# Patient Record
Sex: FEMALE | Race: WHITE | NOT HISPANIC OR LATINO | Employment: OTHER | URBAN - METROPOLITAN AREA
[De-identification: names, ages, dates, MRNs, and addresses within clinical notes are randomized per-mention and may not be internally consistent; named-entity substitution may affect disease eponyms.]

---

## 2017-01-02 ENCOUNTER — GENERIC CONVERSION - ENCOUNTER (OUTPATIENT)
Dept: OTHER | Facility: OTHER | Age: 82
End: 2017-01-02

## 2017-01-04 ENCOUNTER — GENERIC CONVERSION - ENCOUNTER (OUTPATIENT)
Dept: OTHER | Facility: OTHER | Age: 82
End: 2017-01-04

## 2017-01-10 ENCOUNTER — GENERIC CONVERSION - ENCOUNTER (OUTPATIENT)
Dept: OTHER | Facility: OTHER | Age: 82
End: 2017-01-10

## 2017-02-02 ENCOUNTER — GENERIC CONVERSION - ENCOUNTER (OUTPATIENT)
Dept: OTHER | Facility: OTHER | Age: 82
End: 2017-02-02

## 2018-01-10 NOTE — RESULT NOTES
Verified Results  (1) IRON [id number] 10:06AM Kalpesh Wilhelm     Test Name Result Flag Reference   IRON, TOTAL 68 mcg/dL       (Q) CBC (H/H, RBC, INDICES, WBC, PLT) [id number] 10:06AM Kalpesh Wilhelm     Test Name Result Flag Reference   WHITE BLOOD CELL COUNT 10 6 Thousand/uL  3 8-10 8   RED BLOOD CELL COUNT 4 10 Million/uL  3 80-5 10   HEMOGLOBIN 10 9 g/dL L 11 7-15 5   HEMATOCRIT 32 9 % L 35 0-45 0   MCV 80 2 fL  80 0-100 0   MCH 26 6 pg L 27 0-33 0   MCHC 33 2 g/dL  32 0-36 0   RDW 17 7 % H 11 0-15 0   PLATELET COUNT 529 Thousand/uL H 140-400   MPV 7 9 fL  7 5-11 5     (1) OP BINTA FERRITIN [id number] 10:06AM Kalpesh Wilhelm     Test Name Result Flag Reference   FERRITIN 29 ng/mL

## 2018-01-10 NOTE — RESULT NOTES
Message   Recorded as Task   Date: 02/28/2016 01:44 PM, Created By: Kalpesh Wilhelm   Task Name: Call Patient with results   Assigned To: Kalpesh Wilhelm   Regarding Patient: Inez Orellana, Status:  In Progress   Comment:    Kalpesh Wilhelm - 28 Feb 2016 1:44 PM     Patient Phone: [phone] enlarged from 3,0 to 3 5cm  refer to Dr Vanessa Severin for evaluation   Danica Urban - 02 Mar 2016 1:31 PM     TASK IN PROGRESS   Danica Urban - 02 Mar 2016 1:38 PM     TASK EDITED  pt notified of need to follow up with vascular - she will discuss further with TS when she comes in tomorrow with her  for his appt with TS     Signatures   Electronically signed by : Farhat Tello, ; Mar  2 2016  1:40PM EST                       (Author)

## 2018-01-12 NOTE — RESULT NOTES
Verified Results  US ABDOMINAL AORTA [id number] 12:43PM Juani Murphy Order Number: [id number]     Test Name Result Flag Reference   US ABDOMINAL AORTA (Report)     ABDOMINAL AORTIC ULTRASOUND     INDICATION: Evaluate for aortic aneurysm  COMPARISON: 4/13/2011  FINDINGS:      Ultrasound of the abdominal aorta was performed in longitudinal and transverse planes with a curvilinear transducer  Proximal aorta: 2 6 cm   Mid aorta:  1 8 cm   Distal aorta:  3 5 cm   Right common iliac origin: 1 3 cm   Left common iliac origin: 1 5 cm     No periaortic collections or adenopathy detected  IMPRESSION:     3 5 cm infrarenal abdominal aortic aneurysm  This has increased in size from CT of 4/13/2011 when it measured 3 0 cm  Workstation performed: [id number]     Signed by:    Johnathon Delatorre MD   2/25/16

## 2018-01-13 NOTE — RESULT NOTES
Message   Recorded as Task   Date: 01/04/2017 11:22 AM, Created By: Vivi Tenorio   Task Name: Follow Up   Assigned To: Z[facility]air,Pharmacy Team   Regarding Patient: Jenelle Morales, Status: Active   Yeyo Finch - 04 Jan 2017 11:22 AM     TASK CREATED  Caller: ESTELITA ARCE; General Medical Question; [phone]  Patient's  called and wanted Dr Jessica Bianchi to know that patient was seen by a Visiting Nurse and she told the patient that she has thursh  Per 's patient he said that Dr Jessica Bianchi prescribed a mouth wash for her but now is asking if something can be prescribed for the thrush and he said that we can call him an leave a message if he does not answer the phone so he knows     Kalpesh Wilhelm - 04 Jan 2017 12:00 PM     TASK REPLIED TO: Previously Assigned To Zuhair,Pharmacy Team  I sent a prescription to the patient's pharmacy for Mycelex lg she's to dissolve one her mouth 5 times a day for 2 weeks   Nav Day - 04 Jan 2017 12:20 PM     TASK EDITED  I lmom for pt and  as to what TS sent in     Signatures   Electronically signed by : Joel Mack, ; Jan 4 2017 12:21PM EST                       (Author)

## 2018-01-13 NOTE — RESULT NOTES
Verified Results  (1) LIPID PANEL, FASTING [id number] 08:07AM YarelychaloKalpesh shaw     Test Name Result Flag Reference   CHOLESTEROL, TOTAL 133 mg/dL  125-200   HDL CHOLESTEROL 39 mg/dL L > OR = 46   TRIGLICERIDES 480 mg/dL H <150   LDL-CHOLESTEROL 58 mg/dL (calc)  <130   Desirable range <100 mg/dL for patients with CHD or  diabetes and <70 mg/dL for diabetic patients with  known heart disease  CHOL/HDLC RATIO 3 4 (calc)  < OR = 5 0   NON HDL CHOLESTEROL 94 mg/dL (calc)     Target for non-HDL cholesterol is 30 mg/dL higher than   LDL cholesterol target  (Q) MICROALBUMIN, RANDOM URINE (W/CREATININE) [id number] 08:07AM Yarelyjeremiah Kalpesh     Test Name Result Flag Reference   CREATININE, RANDOM URINE 53 mg/dL     MICROALBUMIN 0 6 mg/dL     Reference Range  Not established   MICROALBUMIN/CREATININE$RATIO, RANDOM URINE 11 mcg/mg creat  <30   The ADA defines abnormalities in albumin  excretion as follows:     Category         Result (mcg/mg creatinine)     Normal                    <30  Microalbuminuria            Clinical albuminuria   > OR = 300     The ADA recommends that at least two of three  specimens collected within a 3-6 month period be  abnormal before considering a patient to be  within a diagnostic category  (1) COMPREHENSIVE METABOLIC PANEL [id number] 31:36PO Kalpesh Wilhelm     Test Name Result Flag Reference   GLUCOSE 101 mg/dL H 65-99   Fasting reference interval   UREA NITROGEN (BUN) 24 mg/dL  7-25   CREATININE 0 90 mg/dL H 0 60-0 88   For patients >52years of age, the reference limit  for Creatinine is approximately 13% higher for people  identified as -American  eGFR NON-AFR   AMERICAN 56 mL/min/1 73m2 L > OR = 60   eGFR AFRICAN AMERICAN 65 mL/min/1 73m2  > OR = 60   BUN/CREATININE RATIO 27 (calc) H 6-22   SODIUM 135 mmol/L  135-146   POTASSIUM 4 5 mmol/L  3 5-5 3   CHLORIDE 98 mmol/L     CARBON DIOXIDE 27 mmol/L  19-30   CALCIUM 10 3 mg/dL  8 6-10 4   PROTEIN, TOTAL 7 5 g/dL  6 1-8 1   ALBUMIN 4 5 g/dL  3 6-5 1   GLOBULIN 3 0 g/dL (calc)  1 9-3 7   ALBUMIN/GLOBULIN RATIO 1 5 (calc)  1 0-2 5   BILIRUBIN, TOTAL 0 8 mg/dL  0 2-1 2   ALKALINE PHOSPHATASE 79 U/L     AST 15 U/L  10-35   ALT 13 U/L  6-29     (1) CBC/PLT/DIFF [id number] 08:07AM Choco Kalpesh     Test Name Result Flag Reference   WHITE BLOOD CELL COUNT 8 1 Thousand/uL  3 8-10 8   RED BLOOD CELL COUNT 4 45 Million/uL  3 80-5 10   HEMOGLOBIN 12 2 g/dL  11 7-15 5   HEMATOCRIT 36 7 %  35 0-45 0   MCV 82 5 fL  80 0-100 0   MCH 27 5 pg  27 0-33 0   MCHC 33 3 g/dL  32 0-36 0   RDW 16 4 % H 11 0-15 0   PLATELET COUNT 016 Thousand/uL H 140-400   MPV 8 6 fL  7 5-11 5   ABSOLUTE NEUTROPHILS 6326 cells/uL  8729-2239   ABSOLUTE LYMPHOCYTES 1069 cells/uL  850-3900   ABSOLUTE MONOCYTES 413 cells/uL  200-950   ABSOLUTE EOSINOPHILS 243 cells/uL     ABSOLUTE BASOPHILS 49 cells/uL  0-200   NEUTROPHILS 78 1 %     LYMPHOCYTES 13 2 %     MONOCYTES 5 1 %     EOSINOPHILS 3 0 %     BASOPHILS 0 6 %       (1) T4, FREE [id number] 08:07AM Choco Kalpesh     Test Name Result Flag Reference   T4, FREE 1 1 ng/dL  0 8-1 8     (Q) TSH, 3RD GENERATION [id number] 08:07AM Kalpesh Wilhelm     Test Name Result Flag Reference   TSH 1 36 mIU/L  0 40-4 50     (Q) HEMOGLOBIN A1c [id number] 08:07AM Kalpesh Wilhelm     Test Name Result Flag Reference   HEMOGLOBIN A1c 5 8 % of total Hgb H <5 7   According to ADA guidelines, hemoglobin A1c <7 0%  represents optimal control in non-pregnant diabetic  patients  Different metrics may apply to specific  patient populations  Standards of Medical Care in    Diabetes Care   2013;36:s11-s66     For the purpose of screening for the presence of  diabetes  <5 7%       Consistent with the absence of diabetes  5 7-6 4%    Consistent with increased risk for diabetes              (prediabetes)  >or=6 5%    Consistent with diabetes     This assay result is consistent with an increased risk  of diabetes  Currently, no consensus exists for use of hemoglobin  A1c for diagnosis of diabetes for children       (Q) URINALYSIS REFLEX [id number] 08:07AM Kalpesh Wilhelm   REPORT COMMENT:  FASTING:YES     Test Name Result Flag Reference   COLOR YELLOW  YELLOW   APPEARANCE CLEAR  CLEAR   SPECIFIC GRAVITY 1 012  1 001-1 035   PH 6 0  5 0-8 0   GLUCOSE NEGATIVE  NEGATIVE   BILIRUBIN NEGATIVE  NEGATIVE   KETONES NEGATIVE  NEGATIVE   OCCULT BLOOD NEGATIVE  NEGATIVE   PROTEIN NEGATIVE  NEGATIVE   NITRITE POSITIVE A NEGATIVE   LEUKOCYTE ESTERASE 3+ A NEGATIVE   WBC > OR = 60 /HPF A < OR = 5   RBC NONE SEEN /HPF  < OR = 2   SQUAMOUS EPITHELIAL CELLS NONE SEEN /HPF  < OR = 5   BACTERIA MODERATE /HPF A NONE SEEN   HYALINE CAST NONE SEEN /LPF  NONE SEEN

## 2018-01-14 NOTE — MISCELLANEOUS
Message  Message Free Text Note Form: pt with sore mouth, no coating redness or blisters  will rx today with lido and pt to make appt tomorrow  if worsens to er      Plan    1   Lidocaine Viscous 2 % Mouth/Throat Solution; Swish and spit 1-2 teaspoons 4   times a day when necessary mouth and throat pain    Signatures   Electronically signed by : Marin Price DO; Jan 2 2017 11:28AM EST                       (Author)

## 2018-01-14 NOTE — RESULT NOTES
Verified Results  (1) AFB CULTURE (MYCOBACTERIA) [id number] 11:34AM Melissa Minor     Test Name Result Flag Reference   CLINICAL REPORT (Report)     Test:        AFB Culture with Stain  Specimen Source: Other  Specimen Type:    Other  Specimen Date:   11/1/2016 11:34 AM  Result Date:    12/21/2016 10:18 AM  Result Status:   Final result  Resulting Lab:   [address] [address]            Tel: [phone]      CULTURE                                       ------------------                                   No AFB Isolated at 6 Weeks      STAIN                                        ------------------                                   No acid fast bacilli seen   Tissue culture right thigh       Signatures   Electronically signed by : SARAH Zee ; Dec 22 2016  7:51AM EST                       (Author)

## 2018-01-15 NOTE — MISCELLANEOUS
Message   Date: 16 Nov 2016 11:21 AM EST, Recorded By: Glenn Malin For: [facility],Team   Pierre from [facility] called regarding pt stating she was seen in the hospital by Tae Solomon  Wound was debrided on 11/1/16  No wound care instructions were given  Offered to schedule appt for tomorrow at [facility]  Pierre would like pt to be seen today or thinks she needs to go to the ER  Pierre states the wound is necrotic measuring 19x 16 x 2 and is red around the edges  Pt is afebrile and no chills  Informed Pierre that the provider is in the OR today doing surgeries but if she feels the pt needs to go to the ER then I advised her to do so  Pierre will advise pt to go to ED for eval          Active Problems    1  Abdominal aortic aneurysm without rupture (441 4) (I71 4)   2  Allergic rhinitis (477 9) (J30 9)   3  Anemia of chronic disease (285 29) (D63 8)   4  Atrial fibrillation (427 31) (I48 91)   5  Cataract (366 9) (H26 9)   6  Cellulitis (682 9) (L03 90)   7  Chronic kidney disease, stage 3 (585 3) (N18 3)   8  Coronary artery disease (414 00) (I25 10)   9  Depression (311) (F32 9)   10  Encounter for screening mammogram for malignant neoplasm of breast (V76 12)    (Z12 31)   11  History of Hematuria (599 70) (R31 9)   12  Hyperlipidemia (272 4) (E78 5)   13  Hypertension (401 9) (I10)   14  Hypothyroidism (244 9) (E03 9)   15  Insect bite (919 4,E906 4) (W57 XXXA)   16  Leukocytosis (288 60) (D72 829)   17  Mitral Valve Replacement   18  Need for influenza vaccination (V04 81) (Z23)   19  Need for prophylactic vaccination and inoculation against influenza (V04 81) (Z23)   20  Osteoarthritis (715 90) (M19 90)   21  Osteoporosis (733 00) (M81 0)   22  Pulmonary heart disease, chronic (416 9) (I27 9)   23  Renal cyst, acquired (593 2) (N28 1)   24  Skin Neoplasm Of The Face (239 2)   25  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)   26   Urinary, incontinence, stress female (625  6) (N39 3)   27  Vitamin D deficiency (268 9) (E55 9)    Current Meds   1  Aspirin 81 MG Oral Tablet Chewable; CHEW AND SWALLOW 1 TABLET DAILY; Therapy: 13SCY1249 to (Evaluate:12Bvb5293); Last EE:77PNL8644 Ordered   2  Atorvastatin Calcium 40 MG Oral Tablet; TAKE 1 TABLET EVERY DAY; Therapy: 30WVE9426 to (Evaluate:38Vcx0291)  Requested for: 05Hro6171; Last   Rx:44Nyu6181 Ordered   3  Cefuroxime Axetil 500 MG Oral Tablet; TAKE 1 TABLET Twice daily until gone; Therapy: 56HRV8687 to (Chastity Carter)  Requested for: 88PYT1521; Last   Rx:39Ncb6339 Ordered   4  Chlorthalidone 25 MG Oral Tablet; Take 1 tablet daily; Therapy: 03Ylj2408-(Evaluate:34Sma1275) Recorded   5  Fluticasone Propionate 50 MCG/ACT Nasal Suspension (Flonase); use 2 sprays in each   nostril once daily; Therapy: 19Kkj8298 to (Last Rx:57Sai3628)  Requested for: 26Stf2111 Ordered   6  Levothyroxine Sodium 88 MCG Oral Tablet; Take one tablet daily; Therapy: 88LLK7618 to (Last Rx:21Myi3695)  Requested for: 93Met2034 Ordered   7  Metoprolol Tartrate 25 MG Oral Tablet; Take 1 tablet twice daily; Therapy: 02XEB9756 to (Evaluate:05Yxl2692)  Requested for: 69NNL1531; Last   Rx:46Xou1559 Ordered   8  Nitrostat 0 4 MG Sublingual Tablet Sublingual (Nitroglycerin); 1 TAB UNDER THE   TONGUE EVERY 5 MIN  UPTO 3 DOSES AS NEEDED FOR CHEST PAIN  CALL   911 IF PAIN PERSISTS; Last Rx:71Wgq7456 Ordered   9  PARoxetine HCl - 20 MG Oral Tablet (Paxil); TAKE ONE TABLET BY MOUTH ONCE   DAILY; Therapy: 75EDT0620 to (Evaluate:78Fdp6646)  Requested for: 49Wpq3808; Last   Rx:11Avf9815 Ordered   10  Vitamin D 2000 UNIT Oral Capsule; TAKE 1 CAPSULE Daily; Therapy: 91GUN1570 to (Last Rx:06Rcu2282) Ordered   11  Zostrix Arthritis Pain Relief 0 025 % External Cream; APPLY SPARINGLY TO AFFECTED    AREA(S) 3 TIMES A DAY; Therapy: 76FWJ4584 to (Evaluate:05Mar2014); Last XZ:07KXD9142 Ordered    Allergies    1  Cipro TABS   2  Macrobid CAPS   3   Promethazine VC/Codeine SYRP   4  Simvastatin TABS   5   Sulfa Drugs    Signatures   Electronically signed by : Andrew July, ; Nov 16 2016 11:28AM EST                       (Author)

## 2018-01-15 NOTE — RESULT NOTES
Message  Patient came in with  to discuss her abdominal aortic aneurysm  At has increased in size from 3 0-3 5 cm we'll refer to Dr Murray Harrington skin for further evaluation and treatment if needed patient verbalized understanding      Plan  Abdominal aortic aneurysm without rupture    · 1 - Brando MUNGUIA, Mikey Medina  (Vascular Surgery) Physician Referral  Consult  Status: Active   Requested for: 56YSJ9784  Care Summary provided  : Yes  Health Maintenance    · Zostavax 28442 UNT/0 65ML Subcutaneous Solution Reconstituted (Zostavax  74171 UNT/0 65ML Subcutaneous Solution Reconstituted);  Please administer to patient    Signatures   Electronically signed by : Sanford Coffman DO; Mar  3 2016 10:25AM EST                       (Author)

## 2018-01-16 NOTE — MISCELLANEOUS
Message  Rec'd call from jeannedanyel [facility] visiting nurse stating that patient just was d/c'd from [facility] late last week and stated that she had a prescription of Potassium but her  burned it  She asked if we could prescribe it again for her  OK per Dr Rainer Narvaez  Script (20meq-one BID) sent to [facility] and pt's daughter Patricia Diop) aware  FYI-when I spoke to [place], she informed me that pt was back at the hospital ([facility]) for a possible mini TIA  They were presently doing tests on her as we spoke    Veronica      Active Problems    1  Abdominal aortic aneurysm without rupture (441 4) (I71 4)   2  Allergic rhinitis (477 9) (J30 9)   3  Anemia of chronic disease (285 29) (D63 8)   4  Atrial fibrillation (427 31) (I48 91)   5  Cataract (366 9) (H26 9)   6  Cellulitis (682 9) (L03 90)   7  Chronic kidney disease, stage 3 (585 3) (N18 3)   8  Coronary artery disease (414 00) (I25 10)   9  Depression (311) (F32 9)   10  Encounter for screening mammogram for malignant neoplasm of breast (V76 12)    (Z12 31)   11  History of Hematuria (599 70) (R31 9)   12  Hyperlipidemia (272 4) (E78 5)   13  Hypertension (401 9) (I10)   14  Hypothyroidism (244 9) (E03 9)   15  Insect bite (919 4,E906 4) (W57 XXXA)   16  Leukocytosis (288 60) (D72 829)   17  Mitral Valve Replacement   18  Need for influenza vaccination (V04 81) (Z23)   19  Need for prophylactic vaccination and inoculation against influenza (V04 81) (Z23)   20  Open wound of right hip and thigh, initial encounter (890 0) (S71 001A,S71 101A)   21  Osteoarthritis (715 90) (M19 90)   22  Osteoporosis (733 00) (M81 0)   23  Pulmonary heart disease, chronic (416 9) (I27 9)   24  Pyoderma gangrenosum (686 01) (L88)   25  Renal cyst, acquired (593 2) (N28 1)   26  Skin Neoplasm Of The Face (239 2)   27  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)   28  Urinary, incontinence, stress female (625 6) (N39 3)   29   Vitamin D deficiency (268 9) (E55 9)    Current Meds   1  Aspirin 81 MG Oral Tablet Chewable; CHEW AND SWALLOW 1 TABLET DAILY; Therapy: 20ZET9548 to (Evaluate:00Tce3324); Last YY:53ITC5739 Ordered   2  Atorvastatin Calcium 40 MG Oral Tablet; TAKE 1 TABLET EVERY DAY; Therapy: 31UGY1710 to (Evaluate:36Vao5103)  Requested for: 16Qhd7273; Last   Rx:36Cdz3394 Ordered   3  Cefuroxime Axetil 500 MG Oral Tablet; TAKE 1 TABLET Twice daily until gone; Therapy: 50OVI2212 to (Joanna Guillen)  Requested for: 42BYB4027; Last   Rx:51Epz3099 Ordered   4  Chlorthalidone 25 MG Oral Tablet; Take 1 tablet daily; Therapy: 57Mek1906-(Evaluate:32Pwg4888) Recorded   5  Fluticasone Propionate 50 MCG/ACT Nasal Suspension (Flonase); use 2 sprays in each   nostril once daily; Therapy: 83Zfy4770 to (Last Rx:02Gfy2811)  Requested for: 03Ahg6034 Ordered   6  Levothyroxine Sodium 88 MCG Oral Tablet; Take one tablet daily; Therapy: 80MBB8765 to (Last Rx:45Szp1159)  Requested for: 93Cvf0166 Ordered   7  Metoprolol Tartrate 25 MG Oral Tablet; Take 1 tablet twice daily; Therapy: 23CBZ4162 to (Evaluate:40Zwm4463)  Requested for: 09JYS5857; Last   Rx:08Lyv1682 Ordered   8  Nitrostat 0 4 MG Sublingual Tablet Sublingual (Nitroglycerin); 1 TAB UNDER THE   TONGUE EVERY 5 MIN  UPTO 3 DOSES AS NEEDED FOR CHEST PAIN  CALL   911 IF PAIN PERSISTS; Last Rx:15Jrm5154 Ordered   9  PARoxetine HCl - 20 MG Oral Tablet (Paxil); TAKE ONE TABLET BY MOUTH ONCE   DAILY; Therapy: 04WDZ4451 to (Evaluate:22Zmd2268)  Requested for: 67Vjc9187; Last   Rx:01Avl3221 Ordered   10  PredniSONE 20 MG Oral Tablet; take 4 tablets (80mg) daily; Therapy: 67ZTA3852 to (Evaluate:30Mar2017); Last Rx:73Njp7600 Ordered   11  Vitamin D 2000 UNIT Oral Capsule; TAKE 1 CAPSULE Daily; Therapy: 40UWU1398 to (Last Rx:23Sep2014) Ordered   12  Zostrix Arthritis Pain Relief 0 025 % External Cream; APPLY SPARINGLY TO AFFECTED    AREA(S) 3 TIMES A DAY; Therapy: 33OLB3587 to (Evaluate:05Mar2014);  Last VL:80QRF1433 Ordered    Allergies    1  Cipro TABS   2  Macrobid CAPS   3  Promethazine VC/Codeine SYRP   4  Simvastatin TABS   5  Sulfa Drugs    Plan  Chronic kidney disease, stage 3, Coronary artery disease, Mitral Valve Replacement    · Potassium Chloride ER 20 MEQ Oral Tablet Extended Release; take 1 tablet by  mouth twice daily    Signatures   Electronically signed by :  Artemus Angelucci, ; Dec  5 2016  1:01PM EST                       (Author)

## 2018-01-17 NOTE — MISCELLANEOUS
Message   Recorded as Task   Date: 11/18/2016 01:57 PM, Created By: System   Task Name: Verify External Doc   Assigned To: Lyla Joshi   Regarding Patient: Timbo Olivier, Status: Active   Comment:    System - 18 Nov 2016 1:57 PM     To: Addie Nixon    Recipient DirectID: [email or web address]  [email or web address]  [email or web address]    From:     Sender DirectID: [email or web address]  Lili Rosenbaum - 28 Nov 2016 1:30 PM     TASK EDITED  Patient is scheduled to see Dr Jennifer Mayes for [address]  on 11/30/2016  Active Problems    1  Abdominal aortic aneurysm without rupture (441 4) (I71 4)   2  Allergic rhinitis (477 9) (J30 9)   3  Anemia of chronic disease (285 29) (D63 8)   4  Atrial fibrillation (427 31) (I48 91)   5  Cataract (366 9) (H26 9)   6  Cellulitis (682 9) (L03 90)   7  Chronic kidney disease, stage 3 (585 3) (N18 3)   8  Coronary artery disease (414 00) (I25 10)   9  Depression (311) (F32 9)   10  Encounter for screening mammogram for malignant neoplasm of breast (V76 12)    (Z12 31)   11  History of Hematuria (599 70) (R31 9)   12  Hyperlipidemia (272 4) (E78 5)   13  Hypertension (401 9) (I10)   14  Hypothyroidism (244 9) (E03 9)   15  Insect bite (919 4,E906 4) (W57 XXXA)   16  Leukocytosis (288 60) (D72 829)   17  Mitral Valve Replacement   18  Need for influenza vaccination (V04 81) (Z23)   19  Need for prophylactic vaccination and inoculation against influenza (V04 81) (Z23)   20  Osteoarthritis (715 90) (M19 90)   21  Osteoporosis (733 00) (M81 0)   22  Pulmonary heart disease, chronic (416 9) (I27 9)   23  Renal cyst, acquired (593 2) (N28 1)   24  Skin Neoplasm Of The Face (239 2)   25  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)   26  Urinary, incontinence, stress female (625 6) (N39 3)   27  Vitamin D deficiency (268 9) (E55 9)    Current Meds   1  Aspirin 81 MG Oral Tablet Chewable; CHEW AND SWALLOW 1 TABLET DAILY;    Therapy: 24TWM6166 to (Evaluate:23Jun2016); Last TD:12HXM6271 Ordered   2  Atorvastatin Calcium 40 MG Oral Tablet; TAKE 1 TABLET EVERY DAY; Therapy: 03WRL9071 to (Evaluate:81Rfu2331)  Requested for: 73Ajn0239; Last   Rx:98Ves6931 Ordered   3  Cefuroxime Axetil 500 MG Oral Tablet; TAKE 1 TABLET Twice daily until gone; Therapy: 16GKT4472 to (Nery Marino)  Requested for: 30EXW6428; Last   Rx:52Aol3635 Ordered   4  Chlorthalidone 25 MG Oral Tablet; Take 1 tablet daily; Therapy: 81Pct0068-(Evaluate:63Ype0401) Recorded   5  Fluticasone Propionate 50 MCG/ACT Nasal Suspension (Flonase); use 2 sprays in each   nostril once daily; Therapy: 26Qgh6746 to (Last Rx:28Hry8412)  Requested for: 40Avt8650 Ordered   6  Levothyroxine Sodium 88 MCG Oral Tablet; Take one tablet daily; Therapy: 40HIF8785 to (Last Rx:64Iba7112)  Requested for: 83Wyq1930 Ordered   7  Metoprolol Tartrate 25 MG Oral Tablet; Take 1 tablet twice daily; Therapy: 12EDT6993 to (Evaluate:02Mzu4497)  Requested for: 85JXE5283; Last   Rx:15Qjj8801 Ordered   8  Nitrostat 0 4 MG Sublingual Tablet Sublingual (Nitroglycerin); 1 TAB UNDER THE   TONGUE EVERY 5 MIN  UPTO 3 DOSES AS NEEDED FOR CHEST PAIN  CALL   911 IF PAIN PERSISTS; Last Rx:99Qcm5342 Ordered   9  PARoxetine HCl - 20 MG Oral Tablet (Paxil); TAKE ONE TABLET BY MOUTH ONCE   DAILY; Therapy: 19SMJ9989 to (Evaluate:02Fap4912)  Requested for: 10Iww7292; Last   Rx:36Qwj1827 Ordered   10  Vitamin D 2000 UNIT Oral Capsule; TAKE 1 CAPSULE Daily; Therapy: 36OHA7119 to (Last Rx:60Lyl9294) Ordered   11  Zostrix Arthritis Pain Relief 0 025 % External Cream; APPLY SPARINGLY TO AFFECTED    AREA(S) 3 TIMES A DAY; Therapy: 34UGI7545 to (Evaluate:05Mar2014); Last KI:25LFE8514 Ordered    Allergies    1  Cipro TABS   2  Macrobid CAPS   3  Promethazine VC/Codeine SYRP   4  Simvastatin TABS   5   Sulfa Drugs    Signatures   Electronically signed by : Jessica Rodriguez, ; Nov 28 2016  1:30PM EST                       (Author)